# Patient Record
Sex: MALE | Race: WHITE | NOT HISPANIC OR LATINO | Employment: OTHER | URBAN - METROPOLITAN AREA
[De-identification: names, ages, dates, MRNs, and addresses within clinical notes are randomized per-mention and may not be internally consistent; named-entity substitution may affect disease eponyms.]

---

## 2021-04-28 ENCOUNTER — HOSPITAL ENCOUNTER (EMERGENCY)
Facility: CLINIC | Age: 71
Discharge: HOME | End: 2021-04-28
Attending: PHYSICIAN ASSISTANT | Admitting: PHYSICIAN ASSISTANT
Payer: COMMERCIAL

## 2021-04-28 ENCOUNTER — APPOINTMENT (OUTPATIENT)
Dept: CT IMAGING | Facility: CLINIC | Age: 71
End: 2021-04-28
Attending: PHYSICIAN ASSISTANT
Payer: COMMERCIAL

## 2021-04-28 VITALS
SYSTOLIC BLOOD PRESSURE: 117 MMHG | WEIGHT: 256.62 LBS | DIASTOLIC BLOOD PRESSURE: 75 MMHG | RESPIRATION RATE: 20 BRPM | OXYGEN SATURATION: 92 % | HEART RATE: 86 BPM | TEMPERATURE: 98 F

## 2021-04-28 DIAGNOSIS — E86.0 DEHYDRATION: ICD-10-CM

## 2021-04-28 DIAGNOSIS — E87.6 HYPOKALEMIA: ICD-10-CM

## 2021-04-28 DIAGNOSIS — R55 SYNCOPE: ICD-10-CM

## 2021-04-28 LAB
ALBUMIN SERPL-MCNC: 3.5 G/DL (ref 3.4–5)
ALP SERPL-CCNC: 65 U/L (ref 40–150)
ALT SERPL W P-5'-P-CCNC: 56 U/L (ref 0–70)
ANION GAP SERPL CALCULATED.3IONS-SCNC: 8 MMOL/L (ref 3–14)
AST SERPL W P-5'-P-CCNC: 47 U/L (ref 0–45)
BASOPHILS # BLD AUTO: 0 10E9/L (ref 0–0.2)
BASOPHILS NFR BLD AUTO: 0.4 %
BILIRUB SERPL-MCNC: 0.6 MG/DL (ref 0.2–1.3)
BUN SERPL-MCNC: 21 MG/DL (ref 7–30)
CALCIUM SERPL-MCNC: 9 MG/DL (ref 8.5–10.1)
CHLORIDE SERPL-SCNC: 92 MMOL/L (ref 94–109)
CO2 SERPL-SCNC: 32 MMOL/L (ref 20–32)
CREAT SERPL-MCNC: 1.33 MG/DL (ref 0.66–1.25)
D DIMER PPP FEU-MCNC: 1.3 UG/ML FEU (ref 0–0.5)
DIFFERENTIAL METHOD BLD: NORMAL
EOSINOPHIL # BLD AUTO: 0.1 10E9/L (ref 0–0.7)
EOSINOPHIL NFR BLD AUTO: 0.7 %
ERYTHROCYTE [DISTWIDTH] IN BLOOD BY AUTOMATED COUNT: 13.7 % (ref 10–15)
ETHANOL SERPL-MCNC: <0.01 G/DL
GFR SERPL CREATININE-BSD FRML MDRD: 53 ML/MIN/{1.73_M2}
GLUCOSE SERPL-MCNC: 230 MG/DL (ref 70–99)
HCT VFR BLD AUTO: 47.6 % (ref 40–53)
HGB BLD-MCNC: 16.5 G/DL (ref 13.3–17.7)
IMM GRANULOCYTES # BLD: 0 10E9/L (ref 0–0.4)
IMM GRANULOCYTES NFR BLD: 0.4 %
INR PPP: 1.33 (ref 0.86–1.14)
LYMPHOCYTES # BLD AUTO: 3.1 10E9/L (ref 0.8–5.3)
LYMPHOCYTES NFR BLD AUTO: 32.8 %
MCH RBC QN AUTO: 31.6 PG (ref 26.5–33)
MCHC RBC AUTO-ENTMCNC: 34.7 G/DL (ref 31.5–36.5)
MCV RBC AUTO: 91 FL (ref 78–100)
MONOCYTES # BLD AUTO: 0.8 10E9/L (ref 0–1.3)
MONOCYTES NFR BLD AUTO: 8.7 %
NEUTROPHILS # BLD AUTO: 5.4 10E9/L (ref 1.6–8.3)
NEUTROPHILS NFR BLD AUTO: 57 %
NRBC # BLD AUTO: 0 10*3/UL
NRBC BLD AUTO-RTO: 0 /100
NT-PROBNP SERPL-MCNC: 524 PG/ML (ref 0–900)
PLATELET # BLD AUTO: 210 10E9/L (ref 150–450)
POTASSIUM SERPL-SCNC: 3 MMOL/L (ref 3.4–5.3)
PROT SERPL-MCNC: 7.7 G/DL (ref 6.8–8.8)
RBC # BLD AUTO: 5.22 10E12/L (ref 4.4–5.9)
SODIUM SERPL-SCNC: 132 MMOL/L (ref 133–144)
TROPONIN I SERPL-MCNC: <0.015 UG/L (ref 0–0.04)
TROPONIN I SERPL-MCNC: <0.015 UG/L (ref 0–0.04)
WBC # BLD AUTO: 9.5 10E9/L (ref 4–11)

## 2021-04-28 PROCEDURE — 99285 EMERGENCY DEPT VISIT HI MDM: CPT | Mod: 25

## 2021-04-28 PROCEDURE — 84484 ASSAY OF TROPONIN QUANT: CPT | Mod: 91 | Performed by: PHYSICIAN ASSISTANT

## 2021-04-28 PROCEDURE — 84484 ASSAY OF TROPONIN QUANT: CPT | Performed by: PHYSICIAN ASSISTANT

## 2021-04-28 PROCEDURE — 83880 ASSAY OF NATRIURETIC PEPTIDE: CPT | Performed by: PHYSICIAN ASSISTANT

## 2021-04-28 PROCEDURE — 250N000011 HC RX IP 250 OP 636: Performed by: PHYSICIAN ASSISTANT

## 2021-04-28 PROCEDURE — 96360 HYDRATION IV INFUSION INIT: CPT | Mod: 59

## 2021-04-28 PROCEDURE — 258N000003 HC RX IP 258 OP 636: Performed by: PHYSICIAN ASSISTANT

## 2021-04-28 PROCEDURE — 85379 FIBRIN DEGRADATION QUANT: CPT | Performed by: PHYSICIAN ASSISTANT

## 2021-04-28 PROCEDURE — 71275 CT ANGIOGRAPHY CHEST: CPT

## 2021-04-28 PROCEDURE — 250N000009 HC RX 250: Performed by: PHYSICIAN ASSISTANT

## 2021-04-28 PROCEDURE — 82077 ASSAY SPEC XCP UR&BREATH IA: CPT | Performed by: PHYSICIAN ASSISTANT

## 2021-04-28 PROCEDURE — 85610 PROTHROMBIN TIME: CPT | Performed by: PHYSICIAN ASSISTANT

## 2021-04-28 PROCEDURE — 93005 ELECTROCARDIOGRAM TRACING: CPT

## 2021-04-28 PROCEDURE — 80053 COMPREHEN METABOLIC PANEL: CPT | Performed by: PHYSICIAN ASSISTANT

## 2021-04-28 PROCEDURE — 250N000013 HC RX MED GY IP 250 OP 250 PS 637: Performed by: PHYSICIAN ASSISTANT

## 2021-04-28 PROCEDURE — 85025 COMPLETE CBC W/AUTO DIFF WBC: CPT | Performed by: PHYSICIAN ASSISTANT

## 2021-04-28 RX ORDER — GABAPENTIN 300 MG/1
300 CAPSULE ORAL 3 TIMES DAILY
COMMUNITY

## 2021-04-28 RX ORDER — GLIPIZIDE 5 MG/1
5 TABLET ORAL
COMMUNITY

## 2021-04-28 RX ORDER — LOPERAMIDE HYDROCHLORIDE 2 MG/1
2 TABLET ORAL 4 TIMES DAILY PRN
COMMUNITY

## 2021-04-28 RX ORDER — IOPAMIDOL 755 MG/ML
500 INJECTION, SOLUTION INTRAVASCULAR ONCE
Status: COMPLETED | OUTPATIENT
Start: 2021-04-28 | End: 2021-04-28

## 2021-04-28 RX ORDER — POTASSIUM CHLORIDE 1500 MG/1
20 TABLET, EXTENDED RELEASE ORAL ONCE
Status: COMPLETED | OUTPATIENT
Start: 2021-04-28 | End: 2021-04-28

## 2021-04-28 RX ORDER — ATORVASTATIN CALCIUM 20 MG/1
10 TABLET, FILM COATED ORAL DAILY
COMMUNITY

## 2021-04-28 RX ORDER — LISINOPRIL 20 MG/1
40 TABLET ORAL DAILY
COMMUNITY

## 2021-04-28 RX ADMIN — SODIUM CHLORIDE 1000 ML: 9 INJECTION, SOLUTION INTRAVENOUS at 21:43

## 2021-04-28 RX ADMIN — IOPAMIDOL 74 ML: 755 INJECTION, SOLUTION INTRAVENOUS at 20:33

## 2021-04-28 RX ADMIN — SODIUM CHLORIDE 90 ML: 9 INJECTION, SOLUTION INTRAVENOUS at 20:33

## 2021-04-28 RX ADMIN — POTASSIUM CHLORIDE 20 MEQ: 1500 TABLET, EXTENDED RELEASE ORAL at 21:43

## 2021-04-28 ASSESSMENT — ENCOUNTER SYMPTOMS
WEAKNESS: 1
DIAPHORESIS: 0
WOUND: 0
DYSURIA: 0
COUGH: 0
CHILLS: 0
DIFFICULTY URINATING: 0
FEVER: 0
FREQUENCY: 0
SORE THROAT: 0
HEADACHES: 0
SHORTNESS OF BREATH: 0
RHINORRHEA: 0
LIGHT-HEADEDNESS: 1
JOINT SWELLING: 0

## 2021-04-29 LAB — INTERPRETATION ECG - MUSE: NORMAL

## 2021-04-29 NOTE — ED NOTES
Bed: ED40  Expected date: 4/28/21  Expected time: 6:38 PM  Means of arrival: Ambulance  Comments:  Karina Rodríguez

## 2021-04-29 NOTE — ED PROVIDER NOTES
History   Chief Complaint:  Loss of Consciousness       HPI   Juan Manuel Carey is a 70 year old male with history of diabetes, CHF, and atrial fibrillation on Eliquis who presents via EMS after a loss of consciousness. The patient says that he was outside sitting in his driveway with his neighbors for about 3 hours when he tried to stand up. He says that when he did he felt weak, fatigued, and had hazy vision. The patient says that he is unsure of he had a complete loss of consciousness. The patient says that this has not happened in the past. He says that he did not hit his head or receive any injuries. He denies any fever, runny nose, sore throat, cough, wheezing, chest pain, shortness of breath, bloody stools, urinary issues, bruising, extremity swelling, headache, or vision changes. The patient says that he did have 2 beers while he was sitting outside. Prior to being in the driveway he was cleaning his garage and did not drink water today while in the sun.     Review of Systems   Constitutional: Negative for chills, diaphoresis and fever.   HENT: Negative for rhinorrhea and sore throat.    Eyes: Negative for visual disturbance.   Respiratory: Negative for cough and shortness of breath.    Cardiovascular: Negative for chest pain and leg swelling.   Genitourinary: Negative for difficulty urinating, dysuria and frequency.   Musculoskeletal: Negative for joint swelling.   Skin: Negative for wound.   Neurological: Positive for syncope, weakness and light-headedness. Negative for headaches.   All other systems reviewed and are negative.    Allergies:  No Known Drug Allergies     Medications:  Apixaban   Atorvastatin  Carboxymethylcellulose   Empagliflozin   Gabapentin  Glipizide   Lisinopril  Ketroloactromethamine   Victoza  Loperamide   Metformin  Eqi-Vigamox     Past Medical History:    Arthritis  Atrial fibrillation  Cataract  Diabetes  Diverticulosis of benito   Hepatic fibrosis   Hyperlipidemia   Obesity    Polyp of colon   Rheumatoid arthritis   Sclerokeratitis     UTI    Past Surgical History:    Cataract surgery     Social History:  Patient presents via EMS  Patient is .     Physical Exam     Patient Vitals for the past 24 hrs:   BP Temp Temp src Pulse Resp SpO2 Weight   04/28/21 2200 117/75 -- -- 86 -- 92 % --   04/28/21 2145 -- -- -- 80 9 94 % --   04/28/21 2130 102/79 -- -- 79 10 96 % --   04/28/21 2115 120/84 -- -- 85 17 91 % --   04/28/21 2030 -- -- -- 77 17 95 % --   04/28/21 2026 -- -- -- -- -- -- 116.4 kg (256 lb 9.9 oz)   04/28/21 2000 104/58 -- -- 73 18 92 % --   04/28/21 1945 95/68 -- -- 77 24 93 % --   04/28/21 1930 97/64 -- -- 73 24 94 % --   04/28/21 1915 113/77 -- -- 80 18 92 % --   04/28/21 1912 -- 98  F (36.7  C) Oral -- -- -- --   04/28/21 1910 114/72 -- -- 80 16 94 % --     Physical Exam  Vitals signs and nursing note reviewed.   Constitutional:       General: He is not in acute distress.     Appearance: He is not diaphoretic.   HENT:      Head: Normocephalic and atraumatic.   Eyes:      General: No scleral icterus.     Extraocular Movements: Extraocular movements intact.   Cardiovascular:      Rate and Rhythm: Normal rate and regular rhythm.      Pulses: Normal pulses.      Heart sounds: Normal heart sounds.   Pulmonary:      Effort: Pulmonary effort is normal. No respiratory distress.      Breath sounds: Normal breath sounds.   Musculoskeletal:         General: No tenderness.      Right lower leg: No edema.      Left lower leg: No edema.   Skin:     General: Skin is warm.      Findings: No rash.   Neurological:      Mental Status: He is alert.         Emergency Department Course     ECG  ECG taken at 1912, ECG read at 1918  Atrial fibrillation  Left axis deviation  Abnormal ECG   Rate 79 bpm. FL interval * ms. QRS duration 78 ms. QT/QTc 384/440 ms. P-R-T axes * -31 39.       Imaging:  CT Chest Pulmonary Embolism w Contrast  1.  No evidence for pulmonary embolism.   2.  Mild  emphysema.  3.  Severe coronary arterial calcification.  Reading per radiology     Laboratory:    CBC: WBC: 9.5, HGB: 16.5, PLT: 210  INR: 1.33 (H)  CMP: Glucose 230 (H), Sodium: 132 (L), Potassium: 3.0 (L), Chloride: 92 (L), Creatinine: 1.33,(H), GFR: 53 (L), AST: 47 (L), o/w WNL   Troponin (Collected 1913): <0.015  Troponin (Collected 2144): <0.015   BNP: 524  Alcohol ethyl: <0.01   D-dimer: 1.3 (H)    Procedures    Emergency Department Course:    Reviewed:  I reviewed nursing notes, vitals and past medical history       Assessments:  1926 I performed an exam of the patient as documented above.   2140 Patient rechecked and updated. The patient mentions that he was cleaning the garage all day and had not drank water all day.   2230 Patient rechecked and updated.      Interventions:  2143 NS 1 L IV   Klor-Con 20 mEq Oral     Disposition:  The patient was discharged to home.       Impression & Plan     Medical Decision Making:  Juan Manuel Carey is a 70 year old male who presents to the emergency department today for evaluation of syncope. Here in the ED he is in atrial fibrillation which is known to him and he is anticoagulated on Eliquis. He did not sustain trauma from the episode of syncope and advanced imaging for such does not appear warranted presently.  He voices manual labor, dehydration, followed by being seated for a prolonged period prior to syncope versus near syncope while attempting to stand. His HPI is reassuring this is likely not 2/2 a concerning or life threatening process. He did not demonstrate an alternative rhythm beside atrial fibrillation and was without RVR or bradycardia. His EKG demonstrated no acute ischemic changes. His troponin was without elevation ruling down myocardial injury/NSTEMI. He denies chest pain, SOB or any complaint during his ED evaluation. Given potential for inadequate anticoagulation, D-dimer obtained to risk stratify for pulmonary embolism which was elevated prompting  CT imaging. This was unremarkable for an acute process such as pulmonary edema, pneumonia, lung mass, pulmonary embolism. No changes to raise suspicion for aortic dissection are noted.  His metabolic panel demonstrates changes of hypokalemia and hyponatremia, acute kidney injury. This appears most c/w dehydration which likely contributed to his near syncope or syncope.    He meets [place] Syncope Rules low risk for discharge. Patient discharged to outpatient care.     Diagnosis:    ICD-10-CM    1. Syncope  R55 Troponin I   2. Dehydration  E86.0    3. Hypokalemia  E87.6        Discharge Medications:  New Prescriptions    No medications on file       Scribe Disclosure:  I, Yury Mcintosh, am serving as a scribe at 7:13 PM on 4/28/2021 to document services personally performed by See Dalton PA-C based on my observations and the provider's statements to me.          See Dalton PA-C  04/28/21 2068

## 2021-04-29 NOTE — ED TRIAGE NOTES
Pt was hanging outside with neighbors when he started not feeling well, felt dizzy and tired, requested to go into the chair when he lost consciousness. Reports have had 2 beers today. When EMS arrived, pt was awake and responsive.  via EMS. Unsure of a-fib dx but is on blood thinners, per EMS a-fib noted in their EKG. A&Ox3. ABCs intact.

## 2021-05-09 ENCOUNTER — HEALTH MAINTENANCE LETTER (OUTPATIENT)
Age: 71
End: 2021-05-09

## 2021-10-24 ENCOUNTER — HEALTH MAINTENANCE LETTER (OUTPATIENT)
Age: 71
End: 2021-10-24

## 2022-06-05 ENCOUNTER — HEALTH MAINTENANCE LETTER (OUTPATIENT)
Age: 72
End: 2022-06-05

## 2022-10-15 ENCOUNTER — HEALTH MAINTENANCE LETTER (OUTPATIENT)
Age: 72
End: 2022-10-15

## 2022-10-27 ENCOUNTER — TRANSCRIBE ORDERS (OUTPATIENT)
Dept: OTHER | Age: 72
End: 2022-10-27

## 2022-10-27 DIAGNOSIS — L60.2 ONYCHOGRYPHOSIS: Primary | ICD-10-CM

## 2022-10-31 ENCOUNTER — OFFICE VISIT (OUTPATIENT)
Dept: PODIATRY | Facility: CLINIC | Age: 72
End: 2022-10-31
Payer: COMMERCIAL

## 2022-10-31 VITALS
BODY MASS INDEX: 41.64 KG/M2 | SYSTOLIC BLOOD PRESSURE: 118 MMHG | WEIGHT: 235 LBS | HEIGHT: 63 IN | DIASTOLIC BLOOD PRESSURE: 72 MMHG

## 2022-10-31 DIAGNOSIS — L60.2 ONYCHOGRYPHOSIS: ICD-10-CM

## 2022-10-31 DIAGNOSIS — L60.3 DYSTROPHIC NAIL: Primary | ICD-10-CM

## 2022-10-31 PROCEDURE — 99203 OFFICE O/P NEW LOW 30 MIN: CPT | Mod: 25 | Performed by: PODIATRIST

## 2022-10-31 PROCEDURE — G0127 TRIM NAIL(S): HCPCS | Performed by: PODIATRIST

## 2022-10-31 RX ORDER — ALLOPURINOL 100 MG/1
100 TABLET ORAL
COMMUNITY
Start: 2022-08-15

## 2022-10-31 NOTE — PROGRESS NOTES
"Foot & Ankle Surgery  October 31, 2022    CC: \"toe nails clipped\"    I was asked to see Juan Manuel Carey regarding the chief complaint by:  [facility]    HPI:  Pt is a 71 year old male who presents with above complaint.  Patient needs his toenails clipped.  No pain, no exacerbating factors.      ROS:   Pos for CC.  The patient denies current nausea, vomiting, chills, fevers, belly pain, calf pain, chest pain or SOB.  Complete remainder of ROS is otherwise neg.    VITALS:    Vitals:    10/31/22 1117   BP: 118/72   Weight: 106.6 kg (235 lb)   Height: 1.6 m (5' 3\")       PMH:    Past Medical History:   Diagnosis Date     Atrial fibrillation (H)      Diabetes (H)      Hypercholesteremia        SXHX:  No past surgical history on file.     MEDS:    Current Outpatient Medications   Medication     allopurinol (ZYLOPRIM) 100 MG tablet     apixaban ANTICOAGULANT (ELIQUIS) 5 MG tablet     atorvastatin (LIPITOR) 20 MG tablet     empagliflozin (JARDIANCE) 25 MG TABS tablet     gabapentin (NEURONTIN) 300 MG capsule     glipiZIDE (GLUCOTROL) 5 MG tablet     lisinopril (ZESTRIL) 20 MG tablet     metFORMIN (GLUCOPHAGE) 500 MG tablet     loperamide (IMODIUM A-D) 2 MG tablet     No current facility-administered medications for this visit.       ALL:   No Known Allergies    FMH:  No family history on file.    SocHx:    Social History     Socioeconomic History     Marital status:      Spouse name: Not on file     Number of children: Not on file     Years of education: Not on file     Highest education level: Not on file   Occupational History     Not on file   Tobacco Use     Smoking status: Former     Smokeless tobacco: Never   Substance and Sexual Activity     Alcohol use: Yes     Alcohol/week: 2.0 standard drinks     Types: 2 Cans of beer per week     Drug use: Never     Sexual activity: Not on file   Other Topics Concern     Not on file   Social History Narrative     Not on file     Social Determinants of Health     Financial " Resource Strain: Not on file   Food Insecurity: Not on file   Transportation Needs: Not on file   Physical Activity: Not on file   Stress: Not on file   Social Connections: Not on file   Intimate Partner Violence: Not on file   Housing Stability: Not on file           EXAMINATION:  Gen:   No apparent distress  Neuro:   A&Ox3, no deficits  Psych:    Answering questions appropriately for age and situation with normal affect  Head:    NCAT  Eye:    Visual scanning without deficit  Ear:    Response to auditory stimuli wnl  Lung:    Non-labored breathing on RA noted  Abd:    NTND per patient report  Lymph:    Neg for pitting/non-pitting edema BLE  Vasc:    Pulses palpable, CFT minimally delayed  Neuro:    Light touch sensation diminished distally  Derm:    Multiple nails are mildly dystrophic.  No wounds or acute dermatological issues  MSK:    Baseline digital deformities  Calf:    Neg for redness, swelling or tenderness    Assessment:  71 year old male with dystrophic nail and digital deformities in setting of DMII with neuropathy      Plan:  Discussed etiologies, anatomy and options  1.  Dystrophic nails and digital deformities in setting of DMII with neuropathy  -I personally reviewed and interpreted the patient's lower extremity history pertinent to today's visit, including imaging/labs, in preparation for initiating a treatment program.  -nails debrided in length/thickness without incident  -routine foot care handout for future routine foot care    Follow up:  prn or sooner with acute issues      Patient's medical history was reviewed today      Jez Garber DPM FACFAS FACFAOM  Podiatric Foot & Ankle Surgeon  [facility]  [phone]    Disclaimer: This note consists of symbols derived from keyboarding, dictation and/or voice recognition software. As a result, there may be errors in the script that have gone undetected. Please consider this when interpreting information found in this  chart.

## 2022-10-31 NOTE — LETTER
"    10/31/2022         RE: Juan Manuel Carey  [address] [address]        Dear Colleague,    Thank you for referring your patient, Juan Manuel Carey, to the [facility]. Please see a copy of my visit note below.    Foot & Ankle Surgery  October 31, 2022    CC: \"toe nails clipped\"    I was asked to see Juan Manuel Carey regarding the chief complaint by:  [facility]    HPI:  Pt is a 71 year old male who presents with above complaint.  Patient needs his toenails clipped.  No pain, no exacerbating factors.      ROS:   Pos for CC.  The patient denies current nausea, vomiting, chills, fevers, belly pain, calf pain, chest pain or SOB.  Complete remainder of ROS is otherwise neg.    VITALS:    Vitals:    10/31/22 1117   BP: 118/72   Weight: 106.6 kg (235 lb)   Height: 1.6 m (5' 3\")       PMH:    Past Medical History:   Diagnosis Date     Atrial fibrillation (H)      Diabetes (H)      Hypercholesteremia        SXHX:  No past surgical history on file.     MEDS:    Current Outpatient Medications   Medication     allopurinol (ZYLOPRIM) 100 MG tablet     apixaban ANTICOAGULANT (ELIQUIS) 5 MG tablet     atorvastatin (LIPITOR) 20 MG tablet     empagliflozin (JARDIANCE) 25 MG TABS tablet     gabapentin (NEURONTIN) 300 MG capsule     glipiZIDE (GLUCOTROL) 5 MG tablet     lisinopril (ZESTRIL) 20 MG tablet     metFORMIN (GLUCOPHAGE) 500 MG tablet     loperamide (IMODIUM A-D) 2 MG tablet     No current facility-administered medications for this visit.       ALL:   No Known Allergies    FMH:  No family history on file.    SocHx:    Social History     Socioeconomic History     Marital status:      Spouse name: Not on file     Number of children: Not on file     Years of education: Not on file     Highest education level: Not on file   Occupational History     Not on file   Tobacco Use     Smoking status: Former     Smokeless tobacco: Never   Substance and Sexual Activity     Alcohol use: Yes     " Alcohol/week: 2.0 standard drinks     Types: 2 Cans of beer per week     Drug use: Never     Sexual activity: Not on file   Other Topics Concern     Not on file   Social History Narrative     Not on file     Social Determinants of Health     Financial Resource Strain: Not on file   Food Insecurity: Not on file   Transportation Needs: Not on file   Physical Activity: Not on file   Stress: Not on file   Social Connections: Not on file   Intimate Partner Violence: Not on file   Housing Stability: Not on file           EXAMINATION:  Gen:   No apparent distress  Neuro:   A&Ox3, no deficits  Psych:    Answering questions appropriately for age and situation with normal affect  Head:    NCAT  Eye:    Visual scanning without deficit  Ear:    Response to auditory stimuli wnl  Lung:    Non-labored breathing on RA noted  Abd:    NTND per patient report  Lymph:    Neg for pitting/non-pitting edema BLE  Vasc:    Pulses palpable, CFT minimally delayed  Neuro:    Light touch sensation diminished distally  Derm:    Multiple nails are mildly dystrophic.  No wounds or acute dermatological issues  MSK:    Baseline digital deformities  Calf:    Neg for redness, swelling or tenderness    Assessment:  71 year old male with dystrophic nail and digital deformities in setting of DMII with neuropathy      Plan:  Discussed etiologies, anatomy and options  1.  Dystrophic nails and digital deformities in setting of DMII with neuropathy  -I personally reviewed and interpreted the patient's lower extremity history pertinent to today's visit, including imaging/labs, in preparation for initiating a treatment program.  -nails debrided in length/thickness without incident  -routine foot care handout for future routine foot care    Follow up:  prn or sooner with acute issues      Patient's medical history was reviewed today      Jez Garber DPM FACFAS FACFAOM  Podiatric Foot & Ankle Surgeon  [facility]  [phone]    Disclaimer: This note consists of symbols derived from keyboarding, dictation and/or voice recognition software. As a result, there may be errors in the script that have gone undetected. Please consider this when interpreting information found in this chart.            Again, thank you for allowing me to participate in the care of your patient.        Sincerely,        Jez Garber DPM, JENNIFER

## 2022-10-31 NOTE — PATIENT INSTRUCTIONS
Thank you for choosing [facility]!    DR. COPE'S CLINIC LOCATIONS:     [facility] (Friday) TRIAGE LINE: [phone] [address] [facility]  APPOINTMENTS: [phone]   [place] [id number] RADIOLOGY: [phone]    PHYSICAL THERAPY: [phone]    SET UP SURGERY: [phone]   [place] (Mon-Tues AM-Thurs) BILLING QUESTIONS: [phone]   [address] FAX: [phone]   [place] [id number]      Here is a list of routine foot care resources, which includes toenail trimming and callus/corn management.     This is not a referral. It is your responsibility to contact the organization and your insurance to confirm cost and coverage.      ROUTINE FOOT CARE (NAIL TRIMMING / CALLUSES)      [facility] (in home)  [phone]   [facility] (out of pocket)  [phone]  Multiple locations   [facility]  [phone] [facility]  [phone]  Multiple locations   [facility]  [phone]  [facility]  [phone]  Multiple locations   [facility]  [phone]  Multiple locations [facility]   [phone]   Multiple locations

## 2023-06-11 ENCOUNTER — HEALTH MAINTENANCE LETTER (OUTPATIENT)
Age: 73
End: 2023-06-11

## 2024-08-04 ENCOUNTER — HEALTH MAINTENANCE LETTER (OUTPATIENT)
Age: 74
End: 2024-08-04

## 2025-08-16 ENCOUNTER — HEALTH MAINTENANCE LETTER (OUTPATIENT)
Age: 75
End: 2025-08-16